# Patient Record
Sex: FEMALE | Race: WHITE | URBAN - METROPOLITAN AREA
[De-identification: names, ages, dates, MRNs, and addresses within clinical notes are randomized per-mention and may not be internally consistent; named-entity substitution may affect disease eponyms.]

---

## 2025-01-09 ENCOUNTER — TELEPHONE (OUTPATIENT)
Age: 40
End: 2025-01-09

## 2025-01-09 NOTE — TELEPHONE ENCOUNTER
Can pt be re-established with dr melara, if not can they schedule with any other provider    Mom said pt is autistic and mom needs to be called to schedule appt, do not call pt    No HIPAA on file